# Patient Record
Sex: MALE | Race: WHITE | NOT HISPANIC OR LATINO | Employment: FULL TIME | URBAN - METROPOLITAN AREA
[De-identification: names, ages, dates, MRNs, and addresses within clinical notes are randomized per-mention and may not be internally consistent; named-entity substitution may affect disease eponyms.]

---

## 2018-02-05 ENCOUNTER — OFFICE VISIT (OUTPATIENT)
Dept: URGENT CARE | Facility: CLINIC | Age: 53
End: 2018-02-05
Payer: COMMERCIAL

## 2018-02-05 VITALS
HEIGHT: 72 IN | TEMPERATURE: 99 F | DIASTOLIC BLOOD PRESSURE: 90 MMHG | WEIGHT: 185 LBS | HEART RATE: 98 BPM | BODY MASS INDEX: 25.06 KG/M2 | RESPIRATION RATE: 14 BRPM | OXYGEN SATURATION: 98 % | SYSTOLIC BLOOD PRESSURE: 136 MMHG

## 2018-02-05 DIAGNOSIS — R30.0 DYSURIA: ICD-10-CM

## 2018-02-05 DIAGNOSIS — Z20.828 EXPOSURE TO THE FLU: ICD-10-CM

## 2018-02-05 DIAGNOSIS — R68.89 FLU-LIKE SYMPTOMS: ICD-10-CM

## 2018-02-05 DIAGNOSIS — R31.9 URINARY TRACT INFECTION WITH HEMATURIA, SITE UNSPECIFIED: ICD-10-CM

## 2018-02-05 DIAGNOSIS — J06.9 UPPER RESPIRATORY TRACT INFECTION, UNSPECIFIED TYPE: Primary | ICD-10-CM

## 2018-02-05 DIAGNOSIS — N39.0 URINARY TRACT INFECTION WITH HEMATURIA, SITE UNSPECIFIED: ICD-10-CM

## 2018-02-05 LAB
BILIRUB UR QL STRIP: NEGATIVE
CTP QC/QA: YES
FLUAV AG NPH QL: NEGATIVE
FLUBV AG NPH QL: NEGATIVE
GLUCOSE UR QL STRIP: NEGATIVE
KETONES UR QL STRIP: NEGATIVE
LEUKOCYTE ESTERASE UR QL STRIP: POSITIVE
PH, POC UA: 6.5 (ref 5–8)
POC BLOOD, URINE: POSITIVE
POC NITRATES, URINE: NEGATIVE
PROT UR QL STRIP: NEGATIVE
SP GR UR STRIP: 1.01 (ref 1–1.03)
UROBILINOGEN UR STRIP-ACNC: 1 (ref 0.3–2.2)

## 2018-02-05 PROCEDURE — 87804 INFLUENZA ASSAY W/OPTIC: CPT | Mod: QW,S$GLB,, | Performed by: NURSE PRACTITIONER

## 2018-02-05 PROCEDURE — 3008F BODY MASS INDEX DOCD: CPT | Mod: S$GLB,,, | Performed by: NURSE PRACTITIONER

## 2018-02-05 PROCEDURE — 99203 OFFICE O/P NEW LOW 30 MIN: CPT | Mod: SA,25,S$GLB, | Performed by: NURSE PRACTITIONER

## 2018-02-05 PROCEDURE — 81003 URINALYSIS AUTO W/O SCOPE: CPT | Mod: QW,S$GLB,, | Performed by: NURSE PRACTITIONER

## 2018-02-05 RX ORDER — NITROFURANTOIN 25; 75 MG/1; MG/1
100 CAPSULE ORAL 2 TIMES DAILY
Qty: 14 CAPSULE | Refills: 0 | Status: SHIPPED | OUTPATIENT
Start: 2018-02-05 | End: 2018-02-12

## 2018-02-05 RX ORDER — TADALAFIL 20 MG/1
TABLET, FILM COATED ORAL
COMMUNITY
Start: 2017-12-19

## 2018-02-05 RX ORDER — OSELTAMIVIR PHOSPHATE 75 MG/1
75 CAPSULE ORAL DAILY
Qty: 10 CAPSULE | Refills: 0 | Status: SHIPPED | OUTPATIENT
Start: 2018-02-05 | End: 2018-02-15

## 2018-02-05 RX ORDER — CETIRIZINE HYDROCHLORIDE 10 MG/1
10 TABLET ORAL DAILY
COMMUNITY

## 2018-02-05 RX ORDER — PHENAZOPYRIDINE HYDROCHLORIDE 100 MG/1
100 TABLET, FILM COATED ORAL 3 TIMES DAILY PRN
Qty: 20 TABLET | Refills: 0 | Status: SHIPPED | OUTPATIENT
Start: 2018-02-05 | End: 2019-02-05

## 2018-02-05 NOTE — PROGRESS NOTES
Subjective:       Patient ID: Azael Garcia is a 52 y.o. male.    Vitals:  height is 6' (1.829 m) and weight is 83.9 kg (185 lb). His temperature is 99.1 °F (37.3 °C). His blood pressure is 136/90 (abnormal) and his pulse is 98. His respiration is 14 and oxygen saturation is 98%.     Chief Complaint: Fever and Dysuria    Pt is c/o fever, dysuria that started yesterday. Pt denies taking any otc meds. Pt denies any other symptoms. Pt denies new sexual partners,lower abdominal pain, lower back pain, or hx of kidney stones.      Fever    This is a new problem. The current episode started yesterday. The problem occurs intermittently. The problem has been gradually worsening. The maximum temperature noted was 100 to 100.9 F. The temperature was taken using an oral thermometer. Associated symptoms include muscle aches and urinary pain. Pertinent negatives include no congestion, coughing, headaches, nausea or sore throat. He has tried nothing for the symptoms.   Dysuria    This is a new problem. The current episode started yesterday. The problem has been gradually worsening. The quality of the pain is described as burning. The pain is at a severity of 6/10. The pain is mild. The maximum temperature recorded prior to his arrival was 100 - 100.9 F. Pertinent negatives include no frequency, hematuria or nausea. He has tried nothing for the symptoms.     Review of Systems   Constitution: Positive for fever.   HENT: Negative for congestion and sore throat.    Respiratory: Negative for cough.    Gastrointestinal: Negative for nausea.   Genitourinary: Positive for dysuria. Negative for frequency and hematuria.   Neurological: Negative for headaches.       Objective:      Physical Exam    .sr  Office Visit on 02/05/2018   Component Date Value Ref Range Status    Rapid Influenza A Ag 02/05/2018 Negative  Negative Final    Rapid Influenza B Ag 02/05/2018 Negative  Negative Final     Acceptable 02/05/2018 Yes    Final    POC Blood, Urine 02/05/2018 Positive* Negative Final    POC Bilirubin, Urine 02/05/2018 Negative  Negative Final    POC Urobilinogen, Urine 02/05/2018 1  0.3 - 2.2 Final    POC Ketones, Urine 02/05/2018 Negative  Negative Final    POC Protein, Urine 02/05/2018 Negative  Negative Final    POC Nitrates, Urine 02/05/2018 Negative  Negative Final    POC Glucose, Urine 02/05/2018 Negative  Negative Final    pH, UA 02/05/2018 6.5  5 - 8 Final    POC Specific Gravity, Urine 02/05/2018 1.010  1.003 - 1.029 Final    POC Leukocytes, Urine 02/05/2018 Positive* Negative Final     Assessment:       1. Upper respiratory tract infection, unspecified type    2. Flu-like symptoms    3. Dysuria    4. Urinary tract infection with hematuria, site unspecified    5. Exposure to the flu        Plan:         Upper respiratory tract infection, unspecified type    Flu-like symptoms  -     POCT Influenza A/B    Dysuria  -     POCT Urinalysis, Dipstick, Automated, W/O Scope  -     phenazopyridine (PYRIDIUM) 100 MG tablet; Take 1 tablet (100 mg total) by mouth 3 (three) times daily as needed for Pain.  Dispense: 20 tablet; Refill: 0    Urinary tract infection with hematuria, site unspecified  -     nitrofurantoin, macrocrystal-monohydrate, (MACROBID) 100 MG capsule; Take 1 capsule (100 mg total) by mouth 2 (two) times daily.  Dispense: 14 capsule; Refill: 0    Exposure to the flu  -     oseltamivir (TAMIFLU) 75 MG capsule; Take 1 capsule (75 mg total) by mouth once daily.  Dispense: 10 capsule; Refill: 0      Patient Instructions                                                              URI   If your condition worsens or fails to improve we recommend that you receive another evaluation at the ER immediately or contact your PCP to discuss your concerns or return here. You must understand that you've received an urgent care treatment only and that you may be released before all your medical problems are known or treated. You  "the patient will arrange for follouwp care as instructed.   If we discussed that I think your illness is viral, it will not respond to antibiotics and will last 5-7 days. If we discussed "wait and see" antibiotics and if over the next few days the symptoms worsen start the antibiotics I have given you.   -  If you are female and on BCP and do take the antibiotics, use additional methods to prevent pregnancy while on the antibiotics and for one cycle after.   -  Flonase (fluticasone) is a nasal spray which is available over the counter and may help with your symptoms.   -  Zyrtec D, Claritin D or Allegra D can also help with symptoms of congestion and drainage.   -  If you have hypertension avoid using the "D" which is the decongestant.  Instead you can use Coricidin HBP for cold and cough symptoms.    -  If you just have drainage you can take plain Zyrtec, Claritin or Allegra   -  If you just have a congested feeling you can take pseudoephedrine (unless you have high blood pressure) which you have to sign for behind the counter. Do not buy the phenylephrine which is on the shelf as it is not effective   -  Rest and fluids are also important.   -  Tylenol or ibuprofen can also be used as directed for pain unless you have an allergy to them or medical condition such as stomach ulcers, kidney or liver disease or blood thinners etc for which you should not be taking these type of medications.   -  If you are flying in the next few days Afrin nose drops for the airplane flight upon take off and landing may help. Other than at those times refrain from using afrin.   - If you were prescribed a narcotic do not drive or operate heavy machinery while taking these medications.                                                                         UTI   If your condition worsens or fails to improve we recommend that you receive another evaluation at the ER immediately or contact your PCP to discuss your concerns or return " here. You must understand that you've received an urgent care treatment only and that you may be released before all your medical problems are known or treated. You the patient will arrange for followup care as instructed.   If you were prescribed antibiotics, please take them to full completion.    Increase fluids as much as possible; Cranberry juice may help. Get the 100% cranberry juice and mix 4 oz of juice with 4 oz of water and drink this 8 oz glass of liquid once a day.         Bladder Infection, Male (Adult)    You have a bladder infection.  Urine is normally free of bacteria. But bacteria can get into the urinary tract from the skin around the rectum or it may travel in the blood from elsewhere in the body.  This is called a urinary tract infection (UTI). An infection can occur anywhere in the urinary tract. It could be in a kidney (pyelonephritis)or in the bladder (cystitis) and urethra (urethritis). The urethra is the tube that drains the urine from the bladder through the tip of the penis.  The most common place for a UTI is in the bladder. This is called a bladder infection. Most bladder infections are easily treated. They are not serious unless the infection spreads up to the kidney.  The terms bladder infection, UTI, and cystitis are often used to describe the same thing, but they arent always the same. Cystitis is an inflammation of the bladder. The most common cause of cystitis is an infection.   Keep in mind:  · Infections in the urine are called UTIs.  · Cystitis is usually caused by a UTI.  · Not all UTIs and cases of cystitis are bladder infections.  · Bladder infections are the most common type of cystitis.  Symptoms of a bladder infection  The infection causes inflammation in the urethra and bladder. This inflammation causes many of the symptoms. The most common symptoms of a bladder infection are:  · Pain or burning when urinating  · Having to go more often than usual  · Feeling like you  need to go right away  · Only a small amount comes out  · Blood in urine  · Discomfort in your belly (abdomen), usually in the lower abdomen, above the pubic bone  · Cloudy, strong, or bad smelling urine  · Unable to urinate (retention)  · Urinary incontinence  · Fever  · Loss of appetite  Older adults may also feel confused.  Causes of a bladder infection  Bladder infections are not contagious. You can't get one from someone else, from a toilet seat, or from sharing a bath.  The most common cause of bladder infections is bacteria from the bowels. The bacteria get onto the skin around the opening of the urethra. From there they can get into the urine and travel up to the bladder. This causes inflammation and an infection. This usually happens because of:  · An enlarged prostate  · Poor cleaning of the genitals  · Procedures that put a tube in your bladder, like a Gallego catheter  · Bowel incontinence  · Older age  · Not emptying your bladder (The urine stays there, giving the bacteria a chance to grow.)  · Dehydration (This allows urine to stay in the bladder longer.)  · Constipation (This can cause the bowels to push on the bladder or urethra and keep the bladder from emptying.)  Treatment  Bladder infections are treated with antibiotics. They usually clear up quickly without complications. Treatment helps prevent a more serious kidney infection.  Medicines  Medicines can help in the treatment of a bladder infection:  · You may have been given phenazopyridine to ease burning when you urinate. It will cause your urine to be bright orange. It can stain clothing.  · You may have been prescribed antibiotics. Take this medicine until you have finished it, even if you feel better. Taking all of the medicine will make sure the infection has cleared.  You can use acetaminophen or ibuprofen for pain, fever, or discomfort, unless another medicine was prescribed. You can also alternate them, or use both together. They work  differently and are a different class of medicines, so taking them together is not an overdose. If you have chronic liver or kidney disease, talk with your healthcare provider before using these medicines. Also talk with your provider if youve had a stomach ulcer or GI bleeding or are taking blood thinner medicines.  Home care  Here are some guidelines to help you care for yourself at home:  · Drink plenty of fluids, unless your healthcare provider told you not to. Fluids will prevent dehydration and flush out your bladder.  · Use good personal hygiene. Wipe from front to back after using the toilet, and clean your penis regularly. If you arent circumcised, retract the foreskin when cleaning.  · Urinate more frequently, and dont try to hold it in for long periods of time, if possible.  · Wear loose-fitting clothes and cotton underwear. Avoid tight-fitting pants. This helps keep you clean and dry.  · Change your diet to prevent constipation. This means eating more fresh foods and more fiber, and less junk and fatty foods.  · Avoid sex until your symptoms are gone.  · Avoid caffeine, alcohol, and spicy foods. These can irritate the bladder.  Follow-up care  Follow up with your healthcare provider, or as advised if all symptoms have not cleared up within 5 days. It is important to keep your follow-up appointment. You can talk with your provider to see if you need more tests of the urinary tract. This is especially important if you have infections that keep coming back.  If a culture was done, you will be told if your treatment needs to be changed. If directed, you can call to find out the results.  If X-rays were taken, you will be told of any findings that may affect your care.  Call 911  Call 911 if any of these occur:  · Trouble breathing  · Difficulty waking up  · Feeling confused  · Fainting or loss of consciousness  · Rapid heart rate  When to seek medical advice  Call your healthcare provider right away if  any of these occur:  · Fever of 100.4ºF (38ºC) or higher, or as directed by your healthcare provider  · Your symptoms dont improve after 2 days of treatment  · Back or abdominal pain that gets worse  · Repeated vomiting, or you arent able to keep medicine down  · Weakness or dizziness  Date Last Reviewed: 10/1/2016  © 9796-7159 [employer]. [address] [place] [address], [place] [address]. All rights reserved. This information is not intended as a substitute for professional medical care. Always follow your healthcare professional's instructions.        Viral Upper Respiratory Illness (Adult)  You have a viral upper respiratory illness (URI), which is another term for the common cold. This illness is contagious during the first few days. It is spread through the air by coughing and sneezing. It may also be spread by direct contact (touching the sick person and then touching your own eyes, nose, or mouth). Frequent handwashing will decrease risk of spread. Most viral illnesses go away within 7 to 10 days with rest and simple home remedies. Sometimes the illness may last for several weeks. Antibiotics will not kill a virus, and they are generally not prescribed for this condition.    Home care  · If symptoms are severe, rest at home for the first 2 to 3 days. When you resume activity, don't let yourself get too tired.  · Avoid being exposed to cigarette smoke (yours or others).  · You may use acetaminophen or ibuprofen to control pain and fever, unless another medicine was prescribed. (Note: If you have chronic liver or kidney disease, have ever had a stomach ulcer or gastrointestinal bleeding, or are taking blood-thinning medicines, talk with your healthcare provider before using these medicines.) Aspirin should never be given to anyone under 18 years of age who is ill with a viral infection or fever. It may cause severe liver or brain damage.  · Your appetite may be poor, so a light diet is fine. Avoid  dehydration by drinking 6 to 8 glasses of fluids per day (water, soft drinks, juices, tea, or soup). Extra fluids will help loosen secretions in the nose and lungs.  · Over-the-counter cold medicines will not shorten the length of time youre sick, but they may be helpful for the following symptoms: cough, sore throat, and nasal and sinus congestion. (Note: Do not use decongestants if you have high blood pressure.)  Follow-up care  Follow up with your healthcare provider, or as advised.  When to seek medical advice  Call your healthcare provider right away if any of these occur:  · Cough with lots of colored sputum (mucus)  · Severe headache; face, neck, or ear pain  · Difficulty swallowing due to throat pain  · Fever of 100.4°F (38°C)  Call 911, or get immediate medical care  Call emergency services right away if any of these occur:  · Chest pain, shortness of breath, wheezing, or difficulty breathing  · Coughing up blood  · Inability to swallow due to throat pain  Date Last Reviewed: 9/13/2015  © 5886-2896 [employer]. [address] [place] [address]. All rights reserved. This information is not intended as a substitute for professional medical care. Always follow your healthcare professional's instructions.        Anatomy of the Male Urinary Tract  Your urinary tract helps to get rid of your bodys liquid waste. The kidneys constantly filter the blood to collect unneeded chemicals and water, making urine. Urine travels through the ureters to the bladder. The bladder fills with urine, holding it until youre ready to release it. Signals from the brain tell the sphincter (muscles around the opening of the bladder) when to let urine flow out of the bladder. The urethra is the tube that carries urine from the bladder out of the body. In men, the prostate gland wraps around the urethra near the bladder.     Front view of male urinary tract.     Date Last Reviewed: 1/1/2017  © 8600-2188 [employer]. [address] [place] [address], [place] [address]. All rights reserved. This information is not intended as a substitute for professional medical care. Always follow your healthcare professional's instructions.            FLU Exposure  You were exposed to sick contacts and prescribed prophylaxis treatment against Influenza.  Please take meds as prescribed.  Continue symptomatic care at home.    Rest and fluids are important.   Follow up with your PCP in the next 2-3 days if no improvement.  Follow up in the ER for any worsening of symptoms.

## 2018-02-06 NOTE — PATIENT INSTRUCTIONS
"                                                         URI   If your condition worsens or fails to improve we recommend that you receive another evaluation at the ER immediately or contact your PCP to discuss your concerns or return here. You must understand that you've received an urgent care treatment only and that you may be released before all your medical problems are known or treated. You the patient will arrange for follouwp care as instructed.   If we discussed that I think your illness is viral, it will not respond to antibiotics and will last 5-7 days. If we discussed "wait and see" antibiotics and if over the next few days the symptoms worsen start the antibiotics I have given you.   -  If you are female and on BCP and do take the antibiotics, use additional methods to prevent pregnancy while on the antibiotics and for one cycle after.   -  Flonase (fluticasone) is a nasal spray which is available over the counter and may help with your symptoms.   -  Zyrtec D, Claritin D or Allegra D can also help with symptoms of congestion and drainage.   -  If you have hypertension avoid using the "D" which is the decongestant.  Instead you can use Coricidin HBP for cold and cough symptoms.    -  If you just have drainage you can take plain Zyrtec, Claritin or Allegra   -  If you just have a congested feeling you can take pseudoephedrine (unless you have high blood pressure) which you have to sign for behind the counter. Do not buy the phenylephrine which is on the shelf as it is not effective   -  Rest and fluids are also important.   -  Tylenol or ibuprofen can also be used as directed for pain unless you have an allergy to them or medical condition such as stomach ulcers, kidney or liver disease or blood thinners etc for which you should not be taking these type of medications.   -  If you are flying in the next few days Afrin nose drops for the airplane flight upon take off and landing may help. Other than at " those times refrain from using afrin.   - If you were prescribed a narcotic do not drive or operate heavy machinery while taking these medications.                                                                         UTI   If your condition worsens or fails to improve we recommend that you receive another evaluation at the ER immediately or contact your PCP to discuss your concerns or return here. You must understand that you've received an urgent care treatment only and that you may be released before all your medical problems are known or treated. You the patient will arrange for followup care as instructed.   If you were prescribed antibiotics, please take them to full completion.    Increase fluids as much as possible; Cranberry juice may help. Get the 100% cranberry juice and mix 4 oz of juice with 4 oz of water and drink this 8 oz glass of liquid once a day.       FLU Exposure  You were exposed to sick contacts and prescribed prophylaxis treatment against Influenza.  Please take meds as prescribed.  Continue symptomatic care at home.    Rest and fluids are important.   Follow up with your PCP in the next 2-3 days if no improvement.  Follow up in the ER for any worsening of symptoms.    Bladder Infection, Male (Adult)    You have a bladder infection.  Urine is normally free of bacteria. But bacteria can get into the urinary tract from the skin around the rectum or it may travel in the blood from elsewhere in the body.  This is called a urinary tract infection (UTI). An infection can occur anywhere in the urinary tract. It could be in a kidney (pyelonephritis)or in the bladder (cystitis) and urethra (urethritis). The urethra is the tube that drains the urine from the bladder through the tip of the penis.  The most common place for a UTI is in the bladder. This is called a bladder infection. Most bladder infections are easily treated. They are not serious unless the infection spreads up to the kidney.  The  terms bladder infection, UTI, and cystitis are often used to describe the same thing, but they arent always the same. Cystitis is an inflammation of the bladder. The most common cause of cystitis is an infection.   Keep in mind:  · Infections in the urine are called UTIs.  · Cystitis is usually caused by a UTI.  · Not all UTIs and cases of cystitis are bladder infections.  · Bladder infections are the most common type of cystitis.  Symptoms of a bladder infection  The infection causes inflammation in the urethra and bladder. This inflammation causes many of the symptoms. The most common symptoms of a bladder infection are:  · Pain or burning when urinating  · Having to go more often than usual  · Feeling like you need to go right away  · Only a small amount comes out  · Blood in urine  · Discomfort in your belly (abdomen), usually in the lower abdomen, above the pubic bone  · Cloudy, strong, or bad smelling urine  · Unable to urinate (retention)  · Urinary incontinence  · Fever  · Loss of appetite  Older adults may also feel confused.  Causes of a bladder infection  Bladder infections are not contagious. You can't get one from someone else, from a toilet seat, or from sharing a bath.  The most common cause of bladder infections is bacteria from the bowels. The bacteria get onto the skin around the opening of the urethra. From there they can get into the urine and travel up to the bladder. This causes inflammation and an infection. This usually happens because of:  · An enlarged prostate  · Poor cleaning of the genitals  · Procedures that put a tube in your bladder, like a Gallego catheter  · Bowel incontinence  · Older age  · Not emptying your bladder (The urine stays there, giving the bacteria a chance to grow.)  · Dehydration (This allows urine to stay in the bladder longer.)  · Constipation (This can cause the bowels to push on the bladder or urethra and keep the bladder from emptying.)  Treatment  Bladder  infections are treated with antibiotics. They usually clear up quickly without complications. Treatment helps prevent a more serious kidney infection.  Medicines  Medicines can help in the treatment of a bladder infection:  · You may have been given phenazopyridine to ease burning when you urinate. It will cause your urine to be bright orange. It can stain clothing.  · You may have been prescribed antibiotics. Take this medicine until you have finished it, even if you feel better. Taking all of the medicine will make sure the infection has cleared.  You can use acetaminophen or ibuprofen for pain, fever, or discomfort, unless another medicine was prescribed. You can also alternate them, or use both together. They work differently and are a different class of medicines, so taking them together is not an overdose. If you have chronic liver or kidney disease, talk with your healthcare provider before using these medicines. Also talk with your provider if youve had a stomach ulcer or GI bleeding or are taking blood thinner medicines.  Home care  Here are some guidelines to help you care for yourself at home:  · Drink plenty of fluids, unless your healthcare provider told you not to. Fluids will prevent dehydration and flush out your bladder.  · Use good personal hygiene. Wipe from front to back after using the toilet, and clean your penis regularly. If you arent circumcised, retract the foreskin when cleaning.  · Urinate more frequently, and dont try to hold it in for long periods of time, if possible.  · Wear loose-fitting clothes and cotton underwear. Avoid tight-fitting pants. This helps keep you clean and dry.  · Change your diet to prevent constipation. This means eating more fresh foods and more fiber, and less junk and fatty foods.  · Avoid sex until your symptoms are gone.  · Avoid caffeine, alcohol, and spicy foods. These can irritate the bladder.  Follow-up care  Follow up with your healthcare provider, or  as advised if all symptoms have not cleared up within 5 days. It is important to keep your follow-up appointment. You can talk with your provider to see if you need more tests of the urinary tract. This is especially important if you have infections that keep coming back.  If a culture was done, you will be told if your treatment needs to be changed. If directed, you can call to find out the results.  If X-rays were taken, you will be told of any findings that may affect your care.  Call 911  Call 911 if any of these occur:  · Trouble breathing  · Difficulty waking up  · Feeling confused  · Fainting or loss of consciousness  · Rapid heart rate  When to seek medical advice  Call your healthcare provider right away if any of these occur:  · Fever of 100.4ºF (38ºC) or higher, or as directed by your healthcare provider  · Your symptoms dont improve after 2 days of treatment  · Back or abdominal pain that gets worse  · Repeated vomiting, or you arent able to keep medicine down  · Weakness or dizziness  Date Last Reviewed: 10/1/2016  © 7921-8278 [employer]. [address] [place] [address]. All rights reserved. This information is not intended as a substitute for professional medical care. Always follow your healthcare professional's instructions.        Viral Upper Respiratory Illness (Adult)  You have a viral upper respiratory illness (URI), which is another term for the common cold. This illness is contagious during the first few days. It is spread through the air by coughing and sneezing. It may also be spread by direct contact (touching the sick person and then touching your own eyes, nose, or mouth). Frequent handwashing will decrease risk of spread. Most viral illnesses go away within 7 to 10 days with rest and simple home remedies. Sometimes the illness may last for several weeks. Antibiotics will not kill a virus, and they are generally not prescribed for this condition.    Home  care  · If symptoms are severe, rest at home for the first 2 to 3 days. When you resume activity, don't let yourself get too tired.  · Avoid being exposed to cigarette smoke (yours or others).  · You may use acetaminophen or ibuprofen to control pain and fever, unless another medicine was prescribed. (Note: If you have chronic liver or kidney disease, have ever had a stomach ulcer or gastrointestinal bleeding, or are taking blood-thinning medicines, talk with your healthcare provider before using these medicines.) Aspirin should never be given to anyone under 18 years of age who is ill with a viral infection or fever. It may cause severe liver or brain damage.  · Your appetite may be poor, so a light diet is fine. Avoid dehydration by drinking 6 to 8 glasses of fluids per day (water, soft drinks, juices, tea, or soup). Extra fluids will help loosen secretions in the nose and lungs.  · Over-the-counter cold medicines will not shorten the length of time youre sick, but they may be helpful for the following symptoms: cough, sore throat, and nasal and sinus congestion. (Note: Do not use decongestants if you have high blood pressure.)  Follow-up care  Follow up with your healthcare provider, or as advised.  When to seek medical advice  Call your healthcare provider right away if any of these occur:  · Cough with lots of colored sputum (mucus)  · Severe headache; face, neck, or ear pain  · Difficulty swallowing due to throat pain  · Fever of 100.4°F (38°C)  Call 911, or get immediate medical care  Call emergency services right away if any of these occur:  · Chest pain, shortness of breath, wheezing, or difficulty breathing  · Coughing up blood  · Inability to swallow due to throat pain  Date Last Reviewed: 9/13/2015  © 3187-1423 [employer]. [address] [place] [address], [place] [address]. All rights reserved. This information is not intended as a substitute for professional medical care. Always follow your  healthcare professional's instructions.        Anatomy of the Male Urinary Tract  Your urinary tract helps to get rid of your bodys liquid waste. The kidneys constantly filter the blood to collect unneeded chemicals and water, making urine. Urine travels through the ureters to the bladder. The bladder fills with urine, holding it until youre ready to release it. Signals from the brain tell the sphincter (muscles around the opening of the bladder) when to let urine flow out of the bladder. The urethra is the tube that carries urine from the bladder out of the body. In men, the prostate gland wraps around the urethra near the bladder.     Front view of male urinary tract.     Date Last Reviewed: 1/1/2017  © 9414-2149 [employer]. [address] [place] [address], [place] [address]. All rights reserved. This information is not intended as a substitute for professional medical care. Always follow your healthcare professional's instructions.

## 2019-06-06 ENCOUNTER — OFFICE VISIT (OUTPATIENT)
Dept: URGENT CARE | Facility: CLINIC | Age: 54
End: 2019-06-06
Payer: COMMERCIAL

## 2019-06-06 VITALS
HEIGHT: 72 IN | WEIGHT: 190 LBS | SYSTOLIC BLOOD PRESSURE: 130 MMHG | BODY MASS INDEX: 25.73 KG/M2 | TEMPERATURE: 98 F | DIASTOLIC BLOOD PRESSURE: 82 MMHG | HEART RATE: 84 BPM | OXYGEN SATURATION: 98 % | RESPIRATION RATE: 15 BRPM

## 2019-06-06 DIAGNOSIS — J06.9 VIRAL URI WITH COUGH: Primary | ICD-10-CM

## 2019-06-06 PROCEDURE — 99214 PR OFFICE/OUTPT VISIT, EST, LEVL IV, 30-39 MIN: ICD-10-PCS | Mod: S$GLB,,, | Performed by: NURSE PRACTITIONER

## 2019-06-06 PROCEDURE — 3008F PR BODY MASS INDEX (BMI) DOCUMENTED: ICD-10-PCS | Mod: CPTII,S$GLB,, | Performed by: NURSE PRACTITIONER

## 2019-06-06 PROCEDURE — 3008F BODY MASS INDEX DOCD: CPT | Mod: CPTII,S$GLB,, | Performed by: NURSE PRACTITIONER

## 2019-06-06 PROCEDURE — 99214 OFFICE O/P EST MOD 30 MIN: CPT | Mod: S$GLB,,, | Performed by: NURSE PRACTITIONER

## 2019-06-06 RX ORDER — FLUTICASONE PROPIONATE 50 MCG
1 SPRAY, SUSPENSION (ML) NASAL 2 TIMES DAILY
Qty: 1 BOTTLE | Refills: 0 | Status: SHIPPED | OUTPATIENT
Start: 2019-06-06 | End: 2019-06-20

## 2019-06-06 RX ORDER — PROMETHAZINE HYDROCHLORIDE 6.25 MG/5ML
6.25 SYRUP ORAL 4 TIMES DAILY PRN
Qty: 118 ML | Refills: 0 | Status: SHIPPED | OUTPATIENT
Start: 2019-06-06 | End: 2020-06-05

## 2019-06-06 NOTE — PROGRESS NOTES
Subjective:       Patient ID: Azael Garcia is a 53 y.o. male.    Vitals:  height is 6' (1.829 m) and weight is 86.2 kg (190 lb). His temperature is 98 °F (36.7 °C). His blood pressure is 130/82 and his pulse is 84. His respiration is 15 and oxygen saturation is 98%.     Chief Complaint: Cough    Patient lives, local, has had a cough for the past week that has become productive. Patient has been taking Advil, Delsym and Mucinex for this problem.  Had sore throat early in illness, but it is feeling better now.    No measured fever, but chills and fatigue.  No known sick contacts, but recent air travel (flys extensively).  No N/V/D.  Has Hx of allergic rhinitis and takes Zyrtec daily, but feels these symptoms are different.      Cough   This is a new problem. The current episode started in the past 7 days. The problem has been gradually worsening. The cough is productive of sputum. Associated symptoms include nasal congestion, postnasal drip and a sore throat. Pertinent negatives include no chills, ear pain, eye redness, fever, hemoptysis, myalgias, rash, shortness of breath or wheezing. Nothing aggravates the symptoms.       Constitution: Negative for chills, sweating, fatigue and fever.   HENT: Positive for congestion, postnasal drip and sore throat. Negative for ear pain, sinus pain, sinus pressure and voice change.    Neck: Negative for painful lymph nodes.   Eyes: Negative for eye redness.   Respiratory: Positive for cough. Negative for chest tightness, sputum production, bloody sputum, COPD, shortness of breath, stridor, wheezing and asthma.    Gastrointestinal: Negative for nausea and vomiting.   Musculoskeletal: Negative for muscle ache.   Skin: Negative for rash.   Allergic/Immunologic: Negative for seasonal allergies and asthma.   Hematologic/Lymphatic: Negative for swollen lymph nodes.       Objective:      Physical Exam   Constitutional: He is oriented to person, place, and time. He appears  well-developed and well-nourished. He is cooperative.  Non-toxic appearance. He does not appear ill. No distress.   HENT:   Head: Normocephalic and atraumatic.   Right Ear: Hearing, tympanic membrane, external ear and ear canal normal.   Left Ear: Hearing, tympanic membrane, external ear and ear canal normal.   Nose: Mucosal edema and rhinorrhea present. No nasal deformity. No epistaxis. Right sinus exhibits no maxillary sinus tenderness and no frontal sinus tenderness. Left sinus exhibits no maxillary sinus tenderness and no frontal sinus tenderness.   Mouth/Throat: Uvula is midline and mucous membranes are normal. No trismus in the jaw. Normal dentition. No uvula swelling. Posterior oropharyngeal erythema present. Tonsils are 1+ on the right. Tonsils are 1+ on the left. Tonsillar exudate.   Eyes: Conjunctivae and lids are normal. No scleral icterus.   Sclera clear bilat   Neck: Trachea normal, full passive range of motion without pain and phonation normal. Neck supple.   Cardiovascular: Normal rate, regular rhythm, normal heart sounds, intact distal pulses and normal pulses.   Pulmonary/Chest: Effort normal and breath sounds normal. No stridor. No respiratory distress. He has no decreased breath sounds. He has no wheezes. He has no rhonchi. He has no rales.   Abdominal: Soft. Normal appearance and bowel sounds are normal. He exhibits no distension. There is no tenderness.   Musculoskeletal: Normal range of motion. He exhibits no edema or deformity.   Neurological: He is alert and oriented to person, place, and time. He exhibits normal muscle tone. Coordination normal.   Skin: Skin is warm, dry and intact. He is not diaphoretic. No pallor.   Psychiatric: He has a normal mood and affect. His speech is normal and behavior is normal. Judgment and thought content normal. Cognition and memory are normal.   Nursing note and vitals reviewed.      Assessment:       1. Viral URI with cough        Plan:         Viral URI  with cough  -     fluticasone propionate (FLONASE) 50 mcg/actuation nasal spray; 1 spray (50 mcg total) by Each Nare route 2 (two) times daily. for 14 days  Dispense: 1 Bottle; Refill: 0  -     promethazine (PHENERGAN) 6.25 mg/5 mL syrup; Take 5 mLs (6.25 mg total) by mouth 4 (four) times daily as needed (cough).  Dispense: 118 mL; Refill: 0      Patient Instructions   You may use a product with dextromethorphan (such as Delsym) in concert with the prescribed Promethazine for cough suppression.    You may continue taking Tylenol (acetaminophen) or ibuprofen for pain and fever.      Viral Upper Respiratory Illness (Adult)  You have a viral upper respiratory illness (URI), which is another term for the common cold. This illness is contagious during the first few days. It is spread through the air by coughing and sneezing. It may also be spread by direct contact (touching the sick person and then touching your own eyes, nose, or mouth). Frequent handwashing will decrease risk of spread. Most viral illnesses go away within 7 to 14 days with rest and simple home remedies. Sometimes the illness may last for several weeks. Antibiotics will not kill a virus, and they are generally not prescribed for this condition.    Home care  · If symptoms are severe, rest at home for the first 2 to 3 days. When you resume activity, don't let yourself get too tired.  · Avoid being exposed to cigarette smoke (yours or others).  · You may use acetaminophen or ibuprofen to control pain and fever, unless another medicine was prescribed. (Note: If you have chronic liver or kidney disease, have ever had a stomach ulcer or gastrointestinal bleeding, or are taking blood-thinning medicines, talk with your healthcare provider before using these medicines.) Aspirin should never be given to anyone under 18 years of age who is ill with a viral infection or fever. It may cause severe liver or brain damage.  · Your appetite may be poor, so a light  diet is fine. Avoid dehydration by drinking 6 to 8 glasses of fluids per day (water, soft drinks, juices, tea, or soup). Extra fluids will help loosen secretions in the nose and lungs.  · Over-the-counter cold medicines will not shorten the length of time youre sick, but they may be helpful for the following symptoms: cough, sore throat, and nasal and sinus congestion. (Note: Do not use decongestants if you have high blood pressure.)  Follow-up care  Follow up with your healthcare provider, or as advised.  When to seek medical advice  Call your healthcare provider right away if any of these occur:  · Cough with lots of colored sputum (mucus)  · Severe headache; face, neck, or ear pain  · Difficulty swallowing due to throat pain  · Fever of 100.4°F (38°C)  Call 911, or get immediate medical care  Call emergency services right away if any of these occur:  · Chest pain, shortness of breath, wheezing, or difficulty breathing  · Coughing up blood  · Inability to swallow due to throat pain  Date Last Reviewed: 9/13/2015 © 2000-2017 [employer]. [address] [place] [id number]. All rights reserved. This information is not intended as a substitute for professional medical care. Always follow your healthcare professional's instructions.

## 2019-06-06 NOTE — PATIENT INSTRUCTIONS
You may use a product with dextromethorphan (such as Delsym) in concert with the prescribed Promethazine for cough suppression.    You may continue taking Tylenol (acetaminophen) or ibuprofen for pain and fever.      Viral Upper Respiratory Illness (Adult)  You have a viral upper respiratory illness (URI), which is another term for the common cold. This illness is contagious during the first few days. It is spread through the air by coughing and sneezing. It may also be spread by direct contact (touching the sick person and then touching your own eyes, nose, or mouth). Frequent handwashing will decrease risk of spread. Most viral illnesses go away within 7 to 14 days with rest and simple home remedies. Sometimes the illness may last for several weeks. Antibiotics will not kill a virus, and they are generally not prescribed for this condition.    Home care  · If symptoms are severe, rest at home for the first 2 to 3 days. When you resume activity, don't let yourself get too tired.  · Avoid being exposed to cigarette smoke (yours or others).  · You may use acetaminophen or ibuprofen to control pain and fever, unless another medicine was prescribed. (Note: If you have chronic liver or kidney disease, have ever had a stomach ulcer or gastrointestinal bleeding, or are taking blood-thinning medicines, talk with your healthcare provider before using these medicines.) Aspirin should never be given to anyone under 18 years of age who is ill with a viral infection or fever. It may cause severe liver or brain damage.  · Your appetite may be poor, so a light diet is fine. Avoid dehydration by drinking 6 to 8 glasses of fluids per day (water, soft drinks, juices, tea, or soup). Extra fluids will help loosen secretions in the nose and lungs.  · Over-the-counter cold medicines will not shorten the length of time youre sick, but they may be helpful for the following symptoms: cough, sore throat, and nasal and sinus congestion.  (Note: Do not use decongestants if you have high blood pressure.)  Follow-up care  Follow up with your healthcare provider, or as advised.  When to seek medical advice  Call your healthcare provider right away if any of these occur:  · Cough with lots of colored sputum (mucus)  · Severe headache; face, neck, or ear pain  · Difficulty swallowing due to throat pain  · Fever of 100.4°F (38°C)  Call 911, or get immediate medical care  Call emergency services right away if any of these occur:  · Chest pain, shortness of breath, wheezing, or difficulty breathing  · Coughing up blood  · Inability to swallow due to throat pain  Date Last Reviewed: 9/13/2015  © 8765-0225 [employer]. [address] [place] [address], [place] [address]. All rights reserved. This information is not intended as a substitute for professional medical care. Always follow your healthcare professional's instructions.

## 2019-06-09 ENCOUNTER — TELEPHONE (OUTPATIENT)
Dept: URGENT CARE | Facility: CLINIC | Age: 54
End: 2019-06-09

## 2019-07-03 DIAGNOSIS — J06.9 VIRAL URI WITH COUGH: ICD-10-CM

## 2019-07-03 RX ORDER — FLUTICASONE PROPIONATE 50 MCG
1 SPRAY, SUSPENSION (ML) NASAL 2 TIMES DAILY
Qty: 16 ML | Refills: 0 | OUTPATIENT
Start: 2019-07-03 | End: 2019-07-17

## 2020-03-21 ENCOUNTER — NURSE TRIAGE (OUTPATIENT)
Dept: ADMINISTRATIVE | Facility: CLINIC | Age: 55
End: 2020-03-21

## 2020-03-22 NOTE — TELEPHONE ENCOUNTER
Call x2, no answer. LVM.    Reason for Disposition   No answer.  First attempt to contact caller.  Follow-up call scheduled within 15 minutes.    Protocols used: NO CONTACT OR DUPLICATE CONTACT CALL-A-AH

## 2021-01-01 ENCOUNTER — CLINICAL SUPPORT (OUTPATIENT)
Dept: URGENT CARE | Facility: CLINIC | Age: 56
End: 2021-01-01
Payer: COMMERCIAL

## 2021-01-01 DIAGNOSIS — Z11.59 SCREENING FOR VIRAL DISEASE: Primary | ICD-10-CM

## 2021-01-01 LAB
CTP QC/QA: YES
SARS-COV-2 RDRP RESP QL NAA+PROBE: NEGATIVE

## 2021-01-01 PROCEDURE — U0002 COVID-19 LAB TEST NON-CDC: HCPCS | Mod: QW,S$GLB,, | Performed by: NURSE PRACTITIONER

## 2021-01-01 PROCEDURE — U0002: ICD-10-PCS | Mod: QW,S$GLB,, | Performed by: NURSE PRACTITIONER
